# Patient Record
Sex: FEMALE | Race: BLACK OR AFRICAN AMERICAN | NOT HISPANIC OR LATINO | Employment: OTHER | URBAN - METROPOLITAN AREA
[De-identification: names, ages, dates, MRNs, and addresses within clinical notes are randomized per-mention and may not be internally consistent; named-entity substitution may affect disease eponyms.]

---

## 2019-07-18 PROBLEM — E11.10 DIABETIC KETOACIDOSIS WITHOUT COMA ASSOCIATED WITH TYPE 2 DIABETES MELLITUS: Status: ACTIVE | Noted: 2019-07-18

## 2019-07-18 PROBLEM — J18.9 PNEUMONIA DUE TO ORGANISM: Status: ACTIVE | Noted: 2019-07-18

## 2019-09-06 PROBLEM — H40.1132 PRIMARY OPEN ANGLE GLAUCOMA (POAG) OF BOTH EYES, MODERATE STAGE: Status: ACTIVE | Noted: 2019-09-06

## 2019-12-05 PROBLEM — M17.0 OSTEOARTHRITIS OF BOTH KNEES: Status: ACTIVE | Noted: 2018-05-30

## 2019-12-05 PROBLEM — N63.20 LEFT BREAST MASS: Status: RESOLVED | Noted: 2018-08-23 | Resolved: 2019-12-05

## 2019-12-05 PROBLEM — H25.813 COMBINED FORMS OF AGE-RELATED CATARACT OF BOTH EYES: Status: ACTIVE | Noted: 2017-01-19

## 2019-12-05 PROBLEM — H40.1132 PRIMARY OPEN ANGLE GLAUCOMA (POAG) OF BOTH EYES, MODERATE STAGE: Status: RESOLVED | Noted: 2019-09-06 | Resolved: 2019-12-05

## 2019-12-05 PROBLEM — E87.6 HYPOKALEMIA: Status: ACTIVE | Noted: 2019-12-05

## 2019-12-05 PROBLEM — E83.39 HYPOPHOSPHATEMIA: Status: ACTIVE | Noted: 2019-12-05

## 2019-12-05 PROBLEM — N63.20 LEFT BREAST MASS: Status: ACTIVE | Noted: 2018-08-23

## 2019-12-05 PROBLEM — E11.10 DIABETIC KETOACIDOSIS WITHOUT COMA ASSOCIATED WITH TYPE 2 DIABETES MELLITUS: Status: RESOLVED | Noted: 2019-07-18 | Resolved: 2019-12-05

## 2019-12-05 PROBLEM — C50.919 MALIGNANT NEOPLASM OF FEMALE BREAST: Status: ACTIVE | Noted: 2018-09-26

## 2019-12-05 PROBLEM — D50.9 IRON DEFICIENCY ANEMIA: Status: ACTIVE | Noted: 2019-04-24

## 2019-12-05 PROBLEM — J18.9 PNEUMONIA DUE TO ORGANISM: Status: RESOLVED | Noted: 2019-07-18 | Resolved: 2019-12-05

## 2019-12-05 PROBLEM — H25.813 COMBINED FORMS OF AGE-RELATED CATARACT OF BOTH EYES: Status: RESOLVED | Noted: 2017-01-19 | Resolved: 2019-12-05

## 2019-12-18 PROBLEM — K55.21 AVM (ARTERIOVENOUS MALFORMATION) OF SMALL BOWEL, ACQUIRED WITH HEMORRHAGE: Status: ACTIVE | Noted: 2019-12-18

## 2019-12-18 PROBLEM — C50.412 MALIGNANT NEOPLASM OF UPPER-OUTER QUADRANT OF LEFT BREAST IN FEMALE, ESTROGEN RECEPTOR NEGATIVE: Status: ACTIVE | Noted: 2018-09-26

## 2019-12-18 PROBLEM — Z17.1 MALIGNANT NEOPLASM OF UPPER-OUTER QUADRANT OF RIGHT BREAST IN FEMALE, ESTROGEN RECEPTOR NEGATIVE: Status: ACTIVE | Noted: 2018-09-26

## 2019-12-18 PROBLEM — C50.411 MALIGNANT NEOPLASM OF UPPER-OUTER QUADRANT OF RIGHT BREAST IN FEMALE, ESTROGEN RECEPTOR NEGATIVE: Status: ACTIVE | Noted: 2018-09-26

## 2020-03-06 PROBLEM — H57.03 SMALL PUPIL: Status: ACTIVE | Noted: 2020-03-06

## 2020-03-06 PROBLEM — H26.8 MATURE CATARACT: Status: ACTIVE | Noted: 2020-03-06

## 2020-03-13 PROBLEM — H25.811 COMBINED FORMS OF AGE-RELATED CATARACT OF RIGHT EYE: Status: ACTIVE | Noted: 2017-01-19

## 2020-07-08 PROBLEM — H25.812 COMBINED FORM OF AGE-RELATED CATARACT, LEFT EYE: Status: ACTIVE | Noted: 2020-07-08

## 2020-07-21 ENCOUNTER — NURSE TRIAGE (OUTPATIENT)
Dept: ADMINISTRATIVE | Facility: CLINIC | Age: 80
End: 2020-07-21

## 2020-12-13 PROBLEM — Z03.818 ENCOUNTER FOR OBSERVATION FOR SUSPECTED EXPOSURE TO OTHER BIOLOGICAL AGENTS RULED OUT: Status: ACTIVE | Noted: 2020-12-13

## 2020-12-15 PROBLEM — K59.00 CONSTIPATION: Status: ACTIVE | Noted: 2020-12-15

## 2021-11-04 PROBLEM — E83.39 HYPOPHOSPHATEMIA: Status: RESOLVED | Noted: 2019-12-05 | Resolved: 2021-11-04

## 2021-11-04 PROBLEM — N18.31 STAGE 3A CHRONIC KIDNEY DISEASE: Status: ACTIVE | Noted: 2021-11-04

## 2021-11-04 PROBLEM — E87.6 HYPOKALEMIA: Status: RESOLVED | Noted: 2019-12-05 | Resolved: 2021-11-04

## 2021-11-08 PROBLEM — K59.09 CHRONIC CONSTIPATION: Status: ACTIVE | Noted: 2020-12-15

## 2022-04-29 PROBLEM — Z03.818 ENCOUNTER FOR OBSERVATION FOR SUSPECTED EXPOSURE TO OTHER BIOLOGICAL AGENTS RULED OUT: Status: RESOLVED | Noted: 2020-12-13 | Resolved: 2022-04-29

## 2022-08-12 PROBLEM — M85.80 OSTEOPENIA: Status: ACTIVE | Noted: 2022-08-12

## 2023-05-02 PROBLEM — K59.09 CHRONIC CONSTIPATION: Status: RESOLVED | Noted: 2020-12-15 | Resolved: 2023-05-02

## 2023-12-20 ENCOUNTER — TELEPHONE (OUTPATIENT)
Dept: ADMINISTRATIVE | Facility: HOSPITAL | Age: 83
End: 2023-12-20

## 2023-12-20 ENCOUNTER — PATIENT OUTREACH (OUTPATIENT)
Dept: ADMINISTRATIVE | Facility: HOSPITAL | Age: 83
End: 2023-12-20

## 2023-12-20 NOTE — TELEPHONE ENCOUNTER
Outreach to patient for hypertension management.     RE: Need to find out if patient is monitoring blood pressure at home.  If so, the most recent blood pressure is needed to update the chart.    Message left for patient to return call.  LVM for pt to contact the clinic.

## 2023-12-20 NOTE — PROGRESS NOTES
Non-compliant report chart audits for [facility] Non-Compliant BP.. Pt does not have an upcoming appt. Pt needs OV or Nurse visit to check BP.        LVM for pt to contact the clinic.

## 2024-02-22 PROBLEM — H02.883 DRY EYE SYNDROME OF BOTH EYES DUE TO MEIBOMIAN GLAND DYSFUNCTION: Status: ACTIVE | Noted: 2024-02-22

## 2024-02-22 PROBLEM — H04.123 DRY EYE SYNDROME OF BOTH EYES DUE TO MEIBOMIAN GLAND DYSFUNCTION: Status: ACTIVE | Noted: 2024-02-22

## 2024-02-22 PROBLEM — H02.886 DRY EYE SYNDROME OF BOTH EYES DUE TO MEIBOMIAN GLAND DYSFUNCTION: Status: ACTIVE | Noted: 2024-02-22

## 2024-02-26 PROBLEM — N18.32 STAGE 3B CHRONIC KIDNEY DISEASE: Status: ACTIVE | Noted: 2024-02-26
